# Patient Record
Sex: MALE
[De-identification: names, ages, dates, MRNs, and addresses within clinical notes are randomized per-mention and may not be internally consistent; named-entity substitution may affect disease eponyms.]

---

## 2019-09-07 ENCOUNTER — HOSPITAL ENCOUNTER (EMERGENCY)
Dept: HOSPITAL 88 - FSED | Age: 39
LOS: 1 days | Discharge: HOME | End: 2019-09-08
Payer: COMMERCIAL

## 2019-09-07 VITALS — HEIGHT: 67 IN | BODY MASS INDEX: 33.74 KG/M2 | WEIGHT: 215 LBS

## 2019-09-07 DIAGNOSIS — S40.011A: Primary | ICD-10-CM

## 2019-09-07 DIAGNOSIS — M54.2: ICD-10-CM
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
## 2019-09-07 PROCEDURE — 72050 X-RAY EXAM NECK SPINE 4/5VWS: CPT

## 2019-09-07 PROCEDURE — 99283 EMERGENCY DEPT VISIT LOW MDM: CPT

## 2019-09-07 NOTE — XMS REPORT
Continuity of Care Document

                             Created on: 2019



ADRIANE SIN

External Reference #: [id number]

: 1980

Sex: Male



Demographics







                          Address                   [address] [place]  [address]

 

                          Home Phone                [phone]

 

                          Preferred Language        English

 

                          Marital Status            Unknown

 

                          Judaism Affiliation     Unknown

 

                          Race                      Unknown

 

                          Ethnic Group              Unknown





Author







                          Author                    [employer]

 

                          Organization              [employer]

 

                          Address                   Unknown

 

                          Phone                     Unavailable







Care Team Providers







                    Care Team Member Name    Role                Phone

 

                    [email or web address]    Unavailable         Unavailable



                                    



Problems

        





                                        No Data Provided for This Section                    



                                                                



Medications

        





                                        No Data Provided for This Section                    



                                                                



Allergies, Adverse Reactions, Alerts

        





                                        No Known Medication Allergies                      



                                                        



Immunizations

        





                                        No Data Provided for This Section



                                     



Results







                                        No Data Provided for This Section



                    



Pathology Reports







                                        No Data Provided for This Section                    



                            



Diagnostic Reports

            





                                        No Data Provided for This Section                    



                                                            



Consultation Notes

                    





                                        No Data Provided for This Section                    



                                                            



Discharge Summaries

                    





                                        No Data Provided for This Section                    



                                                            



History and Physicals

                    





                                        No Data Provided for This Section                    



                                                                



Vital Signs

                         





                                        No Data Provided for This Section



                                                                 



Encounters

                    





                    Location                            Location Details                            Encounter

 Type                            Encounter Number                            Reason For

 Visit                            Attending Provider                            ADM Date

                            DC Date                            Status                

                                        Source                    

 

                                                                            Out[facility]                  

                    [id number]                                                        [facility]                                              

                          Active                            [email or web address]                    



                                                                        



Procedures

        





                                        No Data Provided for This Section



                                                    



Assessment and Plan

                    





                                        No Data Provided for This Section                    



                                     



Plan of Care







                                        No Data Provided for This Section                    



                                                            



Social History

                    





                                        No Data Provided for This Section                    



                                                                 



Family History

                    





                                        No Data Provided for This Section                    



                                                            



Advance Directives

                    





                                        No Data Provided for This Section                    



                                                            



Functional Status

                    





                                        No Data Provided for This Section
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
## 2019-09-08 VITALS — DIASTOLIC BLOOD PRESSURE: 72 MMHG | SYSTOLIC BLOOD PRESSURE: 119 MMHG

## 2019-09-08 NOTE — NUR
NOTIFIED PT THAT HIS PRESCRIPTIONS HAD THE INCORRECT NAME,  AND TO RETURN TO 
THE ER AND THE CORRECT NAME WOULD BE PT ON MEDICATION.  THE NAME ON 
PRESCRIPTIONS IS THE [facility] NAME.